# Patient Record
Sex: MALE | Race: WHITE
[De-identification: names, ages, dates, MRNs, and addresses within clinical notes are randomized per-mention and may not be internally consistent; named-entity substitution may affect disease eponyms.]

---

## 2018-08-12 ENCOUNTER — HOSPITAL ENCOUNTER (EMERGENCY)
Dept: HOSPITAL 45 - C.EDB | Age: 73
Discharge: HOME | End: 2018-08-12
Payer: COMMERCIAL

## 2018-08-12 VITALS — OXYGEN SATURATION: 100 % | SYSTOLIC BLOOD PRESSURE: 131 MMHG | HEART RATE: 71 BPM | DIASTOLIC BLOOD PRESSURE: 66 MMHG

## 2018-08-12 VITALS
BODY MASS INDEX: 26.44 KG/M2 | BODY MASS INDEX: 26.44 KG/M2 | HEIGHT: 67.01 IN | WEIGHT: 168.43 LBS | HEIGHT: 67.01 IN | WEIGHT: 168.43 LBS

## 2018-08-12 VITALS — TEMPERATURE: 97.7 F

## 2018-08-12 DIAGNOSIS — M19.90: ICD-10-CM

## 2018-08-12 DIAGNOSIS — E11.9: ICD-10-CM

## 2018-08-12 DIAGNOSIS — Z79.899: ICD-10-CM

## 2018-08-12 DIAGNOSIS — Z79.4: ICD-10-CM
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
## 2018-08-12 DIAGNOSIS — Z79.84: ICD-10-CM

## 2018-08-12 DIAGNOSIS — Z79.82: ICD-10-CM

## 2018-08-12 DIAGNOSIS — S63.502A: Primary | ICD-10-CM

## 2018-08-12 NOTE — DIAGNOSTIC IMAGING REPORT
L WRIST W/NAVICULAR MIN 3 VIEWS



CLINICAL HISTORY: Left wrist pain.    



COMPARISON: Left second finger radiographs June 4, 2008.



FINDINGS:  No acute fracture is identified. There is severe osteoarthritis of

the left first carpometacarpal joint. Osteophytosis of the distal left radius

and ulna is noted. Small metallic densities of the left wrist and hand were

shown on exam of June 4, 2008.



IMPRESSION: 



1. No acute fracture or dislocation within the left wrist.



2. Small metallic densities of the left wrist and hand appear similar to study

of June 4, 2008. These may reflect a metallic foreign bodies. No change.



3. Severe osteoarthritis of the left first carpometacarpal joint.







Electronically signed by:  Hernando Joseph M.D.

8/12/2018 3:32 PM



Dictated Date/Time:  8/12/2018 3:30 PM

## 2018-08-12 NOTE — EMERGENCY ROOM VISIT NOTE
History


First contact with patient:  15:02


Chief Complaint:  WRIST PAIN


Stated Complaint:  POSSIBLE BROKEN WRIST





History of Present Illness


The patient is a 72 year old male who presents to the Emergency Room with 

complaints of left wrist pain.  Patient reports that he was maneuvering a boat 

this past Friday and twisted his wrist.  The patient reports persistent pain 

and swelling.  He denies any pain extending into the forearm, and denies any 

paresthesias or numbness of the left hand or fingers.  He rates his pain a 9 

out of 10.  The patient is right-hand dominant.





Review of Systems


10 system review was performed and was negative except for pertinent positives 

and negatives as indicated in history of present illness





Past Medical/Surgical History





Medical Problems:


(1) Diabetes


(2) Osteoarthritis





Family History


Unremarkable





Social History


Smoking Status:  Never Smoker


Alcohol Use:  none


Marital Status:  


Housing Status:  lives with family


Occupation Status:  retired





Current/Historical Medications


Scheduled


Aspirin (Aspirin Ec), 162 MG PO DAILY


Cholecalciferol (Vitamin D3), 2,000 INTER.UNIT PO DAILY


Cinnamon (Cinnamon), 1,000 MG PO DAILY


Cyanocobalamin (Vitamin B12), 1,000 MCG PO DAILY


Docusate Sodium (Stool Softener), 100 MG PO DAILY


Fish Oil (Hamill-3), 1 CAP PO DAILY


Insulin Aspart (Novolog Flexpen), 8 UNITS SC WITH BREAKFAST


Insulin Aspart (Novolog Flexpen), 7 UNITS SC WITH LUNCH


Insulin Aspart (Novolog Flexpen), 8 UNITS SC WITH DINNER


Insulin Glargine (Lantus Solostar), 25 UNITS SC DAILY


Levothyroxine Sodium (Levothyroxine Sodium), 100 MCG PO DAILY


Lisinopril (Zestril), 20 MG PO DAILY


Metformin Hcl (Glucophage), 500 MG PO AFTERNOON


Metformin Hcl (Glucophage), 1,000 MG PO BIDM


Tamsulosin Hcl (Flomax), 0.4 MG PO DAILY





Scheduled PRN


Tramadol (Ultram), 1 TAB PO Q4H PRN for Pain





Physical Exam


Vital Signs











  Date Time  Temp Pulse Resp B/P (MAP) Pulse Ox O2 Delivery O2 Flow Rate FiO2


 


8/12/18 16:47  71 20 131/66 100   


 


8/12/18 14:59 36.5 79 18 133/67 97 Room Air  











Physical Exam


CONSTITUTIONAL:  Healthy and well nourished.  Alert and oriented X 3 with 

positive affect.  Patient does not appear in any significant distress on exam.


HEENT:  Normocephalic, atraumatic.  Pupils equal, round and reactive.  


NECK:  Full active range of motion without discomfort.


MUSCULOSKELETAL: Examination of the left wrist does not show any obvious soft 

tissue edema or ecchymosis.  He is notably tender over the ulnar aspect of the 

wrist.  He has no focal tenderness over the distal radius or anatomic snuffbox.

  He has limited range of motion of the first CMC joint.  Capillary refill of 

the fingers is less than 2 seconds.


INTEGUMENTARY:  No rash or other significant dermatologic conditions noted.


NEUROLOGIC: Left hand and fingers are sensory intact.





Medical Decision & Procedures


ER Provider


Diagnostic Interpretation:


My interpretation of left wrist x-rays does not show any obvious fractures or 

carpal dislocations.  The patient has severe CMC degeneration, and multiple 

metallic foreign bodies noted.  Radiologist report is as follows:





L WRIST W/NAVICULAR MIN 3 VIEWS





CLINICAL HISTORY: Left wrist pain.    





COMPARISON: Left second finger radiographs June 4, 2008.





FINDINGS:  No acute fracture is identified. There is severe osteoarthritis of


the left first carpometacarpal joint. Osteophytosis of the distal left radius


and ulna is noted. Small metallic densities of the left wrist and hand were


shown on exam of June 4, 2008.





IMPRESSION: 





1. No acute fracture or dislocation within the left wrist.





2. Small metallic densities of the left wrist and hand appear similar to study


of June 4, 2008. These may reflect a metallic foreign bodies. No change.





3. Severe osteoarthritis of the left first carpometacarpal joint.





Medications Administered











 Medications


  (Trade)  Dose


 Ordered  Sig/[facility]


 Route  Start Time


 Stop Time Status Last Admin


Dose Admin


 


 Tramadol HCl


  (Ultram Home


 Pack)  1 homepack  UD  ONCE


 PO  8/12/18 16:00


 8/12/18 16:01 DC 8/12/18 16:15


1 HOMEPACK


 


 Tramadol HCl


  (Ultram Tab)  50 mg  ONE  STAT


 PO  8/12/18 15:50


 8/12/18 15:52 DC 8/12/18 16:16


50 MG











ED Course


Patient history and physical exam were performed.  Nurse's notes were reviewed.

  Vital signs were reviewed and were normal.  The patient refused any 

analgesics on initial exam.  X-rays of the left wrist did not show any acute 

findings.  Significant first CMC arthrosis is noted.  The patient reports that 

he has been wearing a wrist brace at home and has persistent discomfort because 

his wrist is moving too much.  I did elect to order an Ortho-Glass volar splint 

for better support.  Neurovascular check after splint placement was normal.  

The patient was encouraged alternate ibuprofen and Tylenol as needed for pain.  

The patient reports that he has also done this at home without significant 

relief, but does not want anything strong for the pain.  He was provided a home 

pack and prescription for Ultram.  The patient was encouraged to follow-up with 

orthopedics for further reevaluation and management.  The patient was happy 

with plan of care, and voiced understanding of all discharge instructions.





The patient was also seen and examined by Dr. Abbott, ED attending physician who 

agrees with workup and plan of care.





Medical Decision








[place] Drug Monitoring Program


Search Results:  patient reviewed within database, no issues identified





Medication Reconcilliation


Current Medication List:  was personally reviewed by me





Blood Pressure Screening


Patient's blood pressure:  Normal blood pressure





Impression





 Primary Impression:  


 Left wrist sprain





Departure Information


Prescriptions





Tramadol (Ultram) 50 Mg Tab


1 TAB PO Q4H Y for Pain, #15 TAB


   For Initial Treatment


   Prov: Luis Alberto Pepper PA         8/12/18





Referrals


Avelina Peña M.D. (PCP)





Patient Instructions


My [facility]





Problem Qualifiers








 Primary Impression:  


 Left wrist sprain


 Encounter type:  initial encounter  Qualified Codes:  S63.502A - Unspecified 

sprain of left wrist, initial encounter

## 2018-08-12 NOTE — EMERGENCY ROOM VISIT NOTE
ED Visit Note


First contact with patient:  15:02


The patient was seen and examined with Luis Alberto Pepper PA-C.  I agree with the 

history, physical and findings.  Please see the note for disposition and 

details.